# Patient Record
Sex: FEMALE | Race: WHITE | NOT HISPANIC OR LATINO | Employment: UNEMPLOYED | URBAN - METROPOLITAN AREA
[De-identification: names, ages, dates, MRNs, and addresses within clinical notes are randomized per-mention and may not be internally consistent; named-entity substitution may affect disease eponyms.]

---

## 2021-04-23 ENCOUNTER — TELEPHONE (OUTPATIENT)
Dept: INTERNAL MEDICINE | Facility: CLINIC | Age: 21
End: 2021-04-23

## 2021-04-23 NOTE — TELEPHONE ENCOUNTER
Caller: KEYSHAWN SAM    Relationship to patient: Mother    Best call back number: [phone]    Patient is needing: THE PATIENT'S MOTHER CALLED AND SCHEDULED THE PATIENT A NEW PATIENT APPOINTMENT FOR DR. TOUSSAINT     THE PATIENT'S MOM SAID SHE IS BEING TREATED FOR AN EATING DISORDER BY A PSYCHIATRIST AND THAT IS WHY SHE IS SCHEDULING WITH A PRIMARY CARE PROVIDER, THE PATIENT'S MOM IS WANTING TO SEE IF HER, AND HER  CAN COME WITH HER DAUGHTER TO THE APPOINTMENT DUE TO THIS REASON SO THEY CAN BE INVOLVED WITH HER CARE     REQUESTED CALLBACK TO SEE IF THIS IS SOMETHING THEY CAN DO

## 2021-05-04 ENCOUNTER — OFFICE VISIT (OUTPATIENT)
Dept: INTERNAL MEDICINE | Facility: CLINIC | Age: 21
End: 2021-05-04

## 2021-05-04 VITALS
HEIGHT: 66 IN | RESPIRATION RATE: 16 BRPM | WEIGHT: 91 LBS | BODY MASS INDEX: 14.63 KG/M2 | HEART RATE: 78 BPM | DIASTOLIC BLOOD PRESSURE: 72 MMHG | TEMPERATURE: 96.8 F | SYSTOLIC BLOOD PRESSURE: 110 MMHG | OXYGEN SATURATION: 100 %

## 2021-05-04 DIAGNOSIS — F50.9 EATING DISORDER, UNSPECIFIED TYPE: ICD-10-CM

## 2021-05-04 DIAGNOSIS — Z00.00 ROUTINE GENERAL MEDICAL EXAMINATION AT A HEALTH CARE FACILITY: Primary | ICD-10-CM

## 2021-05-04 DIAGNOSIS — J45.40 MODERATE PERSISTENT ASTHMA WITHOUT COMPLICATION: ICD-10-CM

## 2021-05-04 PROBLEM — F41.9 ANXIETY AND DEPRESSION: Chronic | Status: ACTIVE | Noted: 2021-05-04

## 2021-05-04 PROBLEM — F32.A ANXIETY AND DEPRESSION: Chronic | Status: ACTIVE | Noted: 2021-05-04

## 2021-05-04 LAB
BILIRUB BLD-MCNC: NEGATIVE MG/DL
CLARITY, POC: CLEAR
COLOR UR: YELLOW
GLUCOSE UR STRIP-MCNC: NEGATIVE MG/DL
KETONES UR QL: NEGATIVE
LEUKOCYTE EST, POC: ABNORMAL
NITRITE UR-MCNC: NEGATIVE MG/ML
PH UR: 7 [PH] (ref 5–8)
PROT UR STRIP-MCNC: NEGATIVE MG/DL
RBC # UR STRIP: NEGATIVE /UL
SP GR UR: 1.02 (ref 1–1.03)
UROBILINOGEN UR QL: NORMAL

## 2021-05-04 PROCEDURE — 81003 URINALYSIS AUTO W/O SCOPE: CPT | Performed by: INTERNAL MEDICINE

## 2021-05-04 PROCEDURE — 93000 ELECTROCARDIOGRAM COMPLETE: CPT | Performed by: INTERNAL MEDICINE

## 2021-05-04 PROCEDURE — 99385 PREV VISIT NEW AGE 18-39: CPT | Performed by: INTERNAL MEDICINE

## 2021-05-04 RX ORDER — ALBUTEROL SULFATE 90 UG/1
2 AEROSOL, METERED RESPIRATORY (INHALATION) EVERY 4 HOURS PRN
Qty: 8 G | Refills: 2 | Status: SHIPPED | OUTPATIENT
Start: 2021-05-04

## 2021-05-04 RX ORDER — SERTRALINE HYDROCHLORIDE 100 MG/1
200 TABLET, FILM COATED ORAL DAILY
COMMUNITY
Start: 2021-04-20

## 2021-05-04 NOTE — PROGRESS NOTES
Chief Complaint   Patient presents with   • Establish Care   • Eating Disorder   • Asthma     Pt here to establish care. Past medical, social, surgical, family history reviewd and placed in the chart.     Subjective   History of Present Illness    Ave Powers is a 20 y.o. female here for annual wellness. Pt does have acute issues to discuss today. States overall things are going well. Taking all meds as prescribed without any issues.   Seeing psychiatry for management of depression/anxiety and eating disorder. Pending IOP at [facility]. Currently on zoloft with recent dose increase which is helping  Uses ICS daily for asthma control    The following portions of the patient's history were reviewed and updated as appropriate: allergies, current medications, past family history, past medical history, past social history, past surgical history, and problem list.    Patient Care Team:  Jo Ann Rosado MD as PCP - General (Internal Medicine & Pediatrics)  Austyn Dejesus MD as Consulting Physician (Psychiatry)  Farzana Carvajal as Counselor (Psychology)    Review of Systems   Constitutional: Negative for activity change, chills and fever.   HENT: Negative for congestion, ear pain, rhinorrhea and sore throat.    Eyes: Negative for double vision, pain and visual disturbance.   Respiratory: Negative for cough, shortness of breath and wheezing.    Cardiovascular: Negative for chest pain, palpitations and leg swelling.   Gastrointestinal: Negative for abdominal pain, blood in stool, constipation, diarrhea, nausea and vomiting.   Endocrine: Negative for cold intolerance and heat intolerance.   Genitourinary: Negative for difficulty urinating, dysuria and frequency.   Musculoskeletal: Negative for arthralgias and myalgias.   Skin: Negative for rash and skin lesions.   Neurological: Negative for dizziness, tremors and headache.   Hematological: Negative for adenopathy. Does not bruise/bleed easily.  "  Psychiatric/Behavioral: Negative for behavioral problems and suicidal ideas.       Body mass index is 14.69 kg/m².   Vitals:    05/04/21 1519   BP: 110/72   Pulse: 78   Resp: 16   Temp: 96.8 °F (36 °C)   SpO2: 100%   Weight: 41.3 kg (91 lb)   Height: 167.6 cm (66\")        Objective     Physical Exam  Vitals and nursing note reviewed.   Constitutional:       General: She is not in acute distress.     Comments: Thin appearance but not frail   HENT:      Head: Normocephalic and atraumatic.      Right Ear: Tympanic membrane and ear canal normal.      Left Ear: Tympanic membrane and ear canal normal.      Nose: Nose normal.      Mouth/Throat:      Mouth: Mucous membranes are moist.      Pharynx: Oropharynx is clear.   Eyes:      Extraocular Movements: Extraocular movements intact.      Conjunctiva/sclera: Conjunctivae normal.      Pupils: Pupils are equal, round, and reactive to light.   Cardiovascular:      Rate and Rhythm: Normal rate and regular rhythm.      Heart sounds: Normal heart sounds. No murmur heard.     Pulmonary:      Effort: Pulmonary effort is normal. No respiratory distress.      Breath sounds: Normal breath sounds. No wheezing or rhonchi.   Abdominal:      General: Bowel sounds are normal. There is no distension.      Palpations: Abdomen is soft. There is no mass.      Tenderness: There is no abdominal tenderness.      Comments: no hepatosplenomegaly   Musculoskeletal:         General: No deformity. Normal range of motion.      Cervical back: Normal range of motion and neck supple.   Skin:     General: Skin is warm and dry.      Capillary Refill: Capillary refill takes less than 2 seconds.      Findings: No lesion or rash.   Neurological:      General: No focal deficit present.      Mental Status: She is alert and oriented to person, place, and time.      Cranial Nerves: No cranial nerve deficit.   Psychiatric:         Mood and Affect: Mood normal.         Behavior: Behavior normal.         Judgment: " Judgment normal.         ECG 12 Lead    Date/Time: 5/4/2021 4:03 PM  Performed by: Jo Ann Rosado MD  Authorized by: Jo Ann Rosado MD   Comparison: not compared with previous ECG   Previous ECG: no previous ECG available  Rhythm: sinus rhythm and sinus arrhythmia  Rate: normal  BPM: 75  ST Segments: ST segments normal  T inversion: aVR and V1  QRS axis: normal  Other: no other findings  Comments: Indication: Restrictive Eating Disorder            Brief Urine Lab Results  (Last result in the past 365 days)      Color   Clarity   Blood   Leuk Est   Nitrite   Protein   CREAT   Urine HCG        05/04/21 1631 Yellow Clear Negative Trace Negative Negative               Current Outpatient Medications:   •  beclomethasone (QVAR) 40 MCG/ACT inhaler, Inhale 2 puffs 2 (Two) Times a Day., Disp: 8.7 g, Rfl: 8  •  sertraline (ZOLOFT) 100 MG tablet, Take 200 mg by mouth Daily., Disp: , Rfl:   •  albuterol sulfate  (90 Base) MCG/ACT inhaler, Inhale 2 puffs Every 4 (Four) Hours As Needed for Wheezing or Shortness of Air., Disp: 8 g, Rfl: 2       Health Maintenance   Topic Date Due   • TDAP/TD VACCINES (1 - Tdap) Never done   • CHLAMYDIA SCREENING  Never done   • INFLUENZA VACCINE  08/01/2021        Immunization History   Administered Date(s) Administered   • COVID-19 (PFIZER) 03/23/2021, 04/13/2021   • Flu Vaccine Intradermal Quad 18-64YR 11/11/2020   • Hep A, 2 Dose 05/02/2013   • Hpv9 12/06/2018       Assessment/Plan     1. Annual Wellness  - Labs ordered today including CBC, CMP, Fasting lipid panel, HgbA1C, TSH, Vit D and Mag, Phos, iron studies. . Will call with results once available and make follow up plan and med changes as appropriate.   - Cont current medications for chronic medical issues, refills sent as needed today.   - EKG done today  - PAP smear not yet indicated, due at 21 yo  - Mammo not yet indicated, due at 42 yo  - Cscope not yet indicated, due at 47 yo  - DEXA not yet indicated, due at 66  yo  - Lung CA screening not indicated  - Immunizations: COVID series completed, Flu UTD. Will obtain imm certificate from previous pediatrician and follow up with updates as indicated.    - Depression screen reviewed with patient and positive.  - Advised behavioral modifications including dietary changes and increased exercise with goal of healthy weight and lifestyle.    2. Eating disorder, unspecified type  Assessment & Plan:  STABLE but not adequately CONTROLLED  - dx in Jan 2021 by Dr. Dejesus  - behaviors had started back around 16 yo  - mostly restrictive behaviors, does occasionally have binging behaviors, rarely purging  - is on waiting list currently for [facility]    Orders:  -     Ferritin  -     Iron Profile  -     Vitamin D 25 Hydroxy  -     Magnesium  -     Phosphorus  -     ECG 12 Lead    3. Moderate persistent asthma without complication  Assessment & Plan:  CONTROLLED  - uses daily controller with ICS, QVar--> uses daily refills sent today  - will send new alb inhaler for rescue inhaler, has not used in several years  - reviewed asthma action plan  - no steroid x >2 years,  noER visits, hospitalizations ever    Orders:  -     beclomethasone (QVAR) 40 MCG/ACT inhaler; Inhale 2 puffs 2 (Two) Times a Day.  Dispense: 8.7 g; Refill: 8  -     albuterol sulfate  (90 Base) MCG/ACT inhaler; Inhale 2 puffs Every 4 (Four) Hours As Needed for Wheezing or Shortness of Air.  Dispense: 8 g; Refill: 2    Return in about 8 months (around 1/4/2022) for follow up asthma.     Niki Rosado MD  [facility]  Phone: [phone]  Fax: [phone]

## 2021-05-04 NOTE — ASSESSMENT & PLAN NOTE
STABLE but not adequately CONTROLLED  - dx in Jan 2021 by Dr. Dejesus  - behaviors had started back around 16 yo  - mostly restrictive behaviors, does occasionally have binging behaviors, rarely purging  - is on waiting list currently for [facility]

## 2021-05-04 NOTE — ASSESSMENT & PLAN NOTE
CONTROLLED  - uses daily controller with ICS, QVar--> uses daily refills sent today  - will send new alb inhaler for rescue inhaler, has not used in several years  - reviewed asthma action plan  - no steroid x >2 years,  noER visits, hospitalizations ever

## 2021-05-04 NOTE — ASSESSMENT & PLAN NOTE
CONTROLLED  - recently had dose increase on zoloft from 100 mg to 200 mg for worsening depression, does feel like this adjustment has made things better, currently feels like she is in a good place  - Reviewed potential side effects of medication including sexual performance changes, insomnia, fatigue, weight changes. Pt tolerating well without any issues.   - managed by Dr. Dejesus, sees routinely at least 1/month at this time

## 2021-05-05 LAB
25(OH)D3+25(OH)D2 SERPL-MCNC: 31.2 NG/ML (ref 30–100)
ALBUMIN SERPL-MCNC: 4.9 G/DL (ref 3.9–5)
ALBUMIN/GLOB SERPL: 2.5 {RATIO} (ref 1.2–2.2)
ALP SERPL-CCNC: 64 IU/L (ref 39–117)
ALT SERPL-CCNC: 8 IU/L (ref 0–32)
AST SERPL-CCNC: 20 IU/L (ref 0–40)
BASOPHILS # BLD AUTO: 0.1 X10E3/UL (ref 0–0.2)
BASOPHILS NFR BLD AUTO: 1 %
BILIRUB SERPL-MCNC: 0.3 MG/DL (ref 0–1.2)
BUN SERPL-MCNC: 18 MG/DL (ref 6–20)
BUN/CREAT SERPL: 28 (ref 9–23)
CALCIUM SERPL-MCNC: 9.6 MG/DL (ref 8.7–10.2)
CHLORIDE SERPL-SCNC: 103 MMOL/L (ref 96–106)
CHOLEST SERPL-MCNC: 161 MG/DL (ref 100–169)
CO2 SERPL-SCNC: 23 MMOL/L (ref 20–29)
CREAT SERPL-MCNC: 0.65 MG/DL (ref 0.57–1)
EOSINOPHIL # BLD AUTO: 0.1 X10E3/UL (ref 0–0.4)
EOSINOPHIL NFR BLD AUTO: 1 %
ERYTHROCYTE [DISTWIDTH] IN BLOOD BY AUTOMATED COUNT: 12.1 % (ref 11.7–15.4)
FERRITIN SERPL-MCNC: 60 NG/ML (ref 15–77)
GLOBULIN SER CALC-MCNC: 2 G/DL (ref 1.5–4.5)
GLUCOSE SERPL-MCNC: 85 MG/DL (ref 65–99)
HBA1C MFR BLD: 5 % (ref 4.8–5.6)
HCT VFR BLD AUTO: 42.9 % (ref 34–46.6)
HDLC SERPL-MCNC: 60 MG/DL
HGB BLD-MCNC: 14.2 G/DL (ref 11.1–15.9)
IMM GRANULOCYTES # BLD AUTO: 0 X10E3/UL (ref 0–0.1)
IMM GRANULOCYTES NFR BLD AUTO: 0 %
IRON SATN MFR SERPL: 45 % (ref 15–55)
IRON SERPL-MCNC: 145 UG/DL (ref 27–159)
LDLC SERPL CALC-MCNC: 87 MG/DL (ref 0–109)
LYMPHOCYTES # BLD AUTO: 2.1 X10E3/UL (ref 0.7–3.1)
LYMPHOCYTES NFR BLD AUTO: 32 %
MAGNESIUM SERPL-MCNC: 2.4 MG/DL (ref 1.6–2.3)
MCH RBC QN AUTO: 28.8 PG (ref 26.6–33)
MCHC RBC AUTO-ENTMCNC: 33.1 G/DL (ref 31.5–35.7)
MCV RBC AUTO: 87 FL (ref 79–97)
MONOCYTES # BLD AUTO: 0.6 X10E3/UL (ref 0.1–0.9)
MONOCYTES NFR BLD AUTO: 9 %
NEUTROPHILS # BLD AUTO: 3.7 X10E3/UL (ref 1.4–7)
NEUTROPHILS NFR BLD AUTO: 57 %
PHOSPHATE SERPL-MCNC: 4.4 MG/DL (ref 3.3–5.1)
PLATELET # BLD AUTO: 210 X10E3/UL (ref 150–450)
POTASSIUM SERPL-SCNC: 4.5 MMOL/L (ref 3.5–5.2)
PROT SERPL-MCNC: 6.9 G/DL (ref 6–8.5)
RBC # BLD AUTO: 4.93 X10E6/UL (ref 3.77–5.28)
SODIUM SERPL-SCNC: 140 MMOL/L (ref 134–144)
TIBC SERPL-MCNC: 320 UG/DL (ref 250–450)
TRIGL SERPL-MCNC: 72 MG/DL (ref 0–89)
TSH SERPL DL<=0.005 MIU/L-ACNC: 1.91 UIU/ML (ref 0.45–4.5)
UIBC SERPL-MCNC: 175 UG/DL (ref 131–425)
VLDLC SERPL CALC-MCNC: 14 MG/DL (ref 5–40)
WBC # BLD AUTO: 6.5 X10E3/UL (ref 3.4–10.8)

## 2021-12-17 ENCOUNTER — TELEPHONE (OUTPATIENT)
Dept: INTERNAL MEDICINE | Facility: CLINIC | Age: 21
End: 2021-12-17

## 2021-12-17 NOTE — TELEPHONE ENCOUNTER
HUB TO READ: ATTEMPTED TO REACH PATIENT IN REGARD TO APPOINTMENT REQUEST BUT THERE WAS NO ANSWER. VOICEMAIL WAS FULL SO I WAS UNABLE TO LEAVE A MESSAGE.

## 2022-05-16 DIAGNOSIS — J45.40 MODERATE PERSISTENT ASTHMA WITHOUT COMPLICATION: ICD-10-CM

## 2022-05-16 NOTE — TELEPHONE ENCOUNTER
Rx Refill Note  Requested Prescriptions     Pending Prescriptions Disp Refills   • beclomethasone (QVAR) 40 MCG/ACT inhaler 8.7 g 8     Sig: Inhale 2 puffs 2 (Two) Times a Day.      Last office visit with prescribing clinician: 5/4/2021      Next office visit with prescribing clinician: Visit date not found            Tete Chapa MA  05/16/22, 08:16 EDT